# Patient Record
Sex: MALE | Race: WHITE | NOT HISPANIC OR LATINO
[De-identification: names, ages, dates, MRNs, and addresses within clinical notes are randomized per-mention and may not be internally consistent; named-entity substitution may affect disease eponyms.]

---

## 2017-07-07 ENCOUNTER — RESULT REVIEW (OUTPATIENT)
Age: 77
End: 2017-07-07

## 2017-11-17 ENCOUNTER — APPOINTMENT (OUTPATIENT)
Dept: UROLOGY | Facility: CLINIC | Age: 77
End: 2017-11-17
Payer: MEDICARE

## 2017-11-17 VITALS
OXYGEN SATURATION: 98 % | BODY MASS INDEX: 31.83 KG/M2 | WEIGHT: 210 LBS | SYSTOLIC BLOOD PRESSURE: 164 MMHG | HEIGHT: 68 IN | TEMPERATURE: 98 F | DIASTOLIC BLOOD PRESSURE: 92 MMHG | HEART RATE: 80 BPM

## 2017-11-17 DIAGNOSIS — Z86.39 PERSONAL HISTORY OF OTHER ENDOCRINE, NUTRITIONAL AND METABOLIC DISEASE: ICD-10-CM

## 2017-11-17 DIAGNOSIS — Z78.9 OTHER SPECIFIED HEALTH STATUS: ICD-10-CM

## 2017-11-17 DIAGNOSIS — I10 ESSENTIAL (PRIMARY) HYPERTENSION: ICD-10-CM

## 2017-11-17 DIAGNOSIS — Z80.3 FAMILY HISTORY OF MALIGNANT NEOPLASM OF BREAST: ICD-10-CM

## 2017-11-17 PROCEDURE — 99213 OFFICE O/P EST LOW 20 MIN: CPT

## 2017-11-17 RX ORDER — TAMSULOSIN HYDROCHLORIDE 0.4 MG/1
0.4 CAPSULE ORAL
Refills: 0 | Status: ACTIVE | COMMUNITY

## 2017-11-17 RX ORDER — SIMVASTATIN 20 MG/1
20 TABLET, FILM COATED ORAL
Refills: 0 | Status: ACTIVE | COMMUNITY

## 2017-11-17 RX ORDER — CIPROFLOXACIN HYDROCHLORIDE 500 MG/1
500 TABLET, FILM COATED ORAL
Qty: 7 | Refills: 0 | Status: DISCONTINUED | COMMUNITY
Start: 2017-08-18 | End: 2017-11-17

## 2017-11-17 RX ORDER — FINASTERIDE 5 MG/1
5 TABLET, FILM COATED ORAL
Refills: 0 | Status: ACTIVE | COMMUNITY

## 2017-11-17 RX ORDER — BLOOD SUGAR DIAGNOSTIC
STRIP MISCELLANEOUS
Qty: 100 | Refills: 0 | Status: ACTIVE | COMMUNITY
Start: 2017-03-08

## 2017-11-17 RX ORDER — METFORMIN HYDROCHLORIDE 1000 MG/1
1000 TABLET, COATED ORAL
Refills: 0 | Status: ACTIVE | COMMUNITY

## 2018-01-12 ENCOUNTER — RESULT REVIEW (OUTPATIENT)
Age: 78
End: 2018-01-12

## 2018-03-02 ENCOUNTER — APPOINTMENT (OUTPATIENT)
Dept: UROLOGY | Facility: CLINIC | Age: 78
End: 2018-03-02
Payer: MEDICARE

## 2018-03-02 VITALS
RESPIRATION RATE: 16 BRPM | HEART RATE: 70 BPM | TEMPERATURE: 98.1 F | DIASTOLIC BLOOD PRESSURE: 67 MMHG | SYSTOLIC BLOOD PRESSURE: 132 MMHG

## 2018-03-02 DIAGNOSIS — N40.1 BENIGN PROSTATIC HYPERPLASIA WITH LOWER URINARY TRACT SYMPMS: ICD-10-CM

## 2018-03-02 DIAGNOSIS — R31.21 ASYMPTOMATIC MICROSCOPIC HEMATURIA: ICD-10-CM

## 2018-03-02 DIAGNOSIS — R35.1 BENIGN PROSTATIC HYPERPLASIA WITH LOWER URINARY TRACT SYMPMS: ICD-10-CM

## 2018-03-02 PROCEDURE — 52000 CYSTOURETHROSCOPY: CPT

## 2018-05-07 ENCOUNTER — APPOINTMENT (OUTPATIENT)
Dept: OPHTHALMOLOGY | Facility: CLINIC | Age: 78
End: 2018-05-07
Payer: MEDICARE

## 2018-05-07 PROCEDURE — 76514 ECHO EXAM OF EYE THICKNESS: CPT

## 2018-05-07 PROCEDURE — 92004 COMPRE OPH EXAM NEW PT 1/>: CPT

## 2018-05-07 PROCEDURE — 92133 CPTRZD OPH DX IMG PST SGM ON: CPT

## 2018-06-11 ENCOUNTER — APPOINTMENT (OUTPATIENT)
Dept: OPHTHALMOLOGY | Facility: CLINIC | Age: 78
End: 2018-06-11
Payer: MEDICARE

## 2018-06-11 PROCEDURE — 92012 INTRM OPH EXAM EST PATIENT: CPT

## 2018-06-11 PROCEDURE — 92083 EXTENDED VISUAL FIELD XM: CPT

## 2018-07-19 ENCOUNTER — APPOINTMENT (OUTPATIENT)
Dept: OPHTHALMOLOGY | Facility: CLINIC | Age: 78
End: 2018-07-19
Payer: MEDICARE

## 2018-07-19 DIAGNOSIS — H04.301 UNSPECIFIED DACRYOCYSTITIS OF RIGHT LACRIMAL PASSAGE: ICD-10-CM

## 2018-07-19 PROCEDURE — 92012 INTRM OPH EXAM EST PATIENT: CPT

## 2018-07-27 PROBLEM — Z78.9 ALCOHOL USE: Status: ACTIVE | Noted: 2017-11-17

## 2018-09-07 ENCOUNTER — APPOINTMENT (OUTPATIENT)
Dept: UROLOGY | Facility: CLINIC | Age: 78
End: 2018-09-07

## 2018-09-13 ENCOUNTER — APPOINTMENT (OUTPATIENT)
Dept: OPHTHALMOLOGY | Facility: CLINIC | Age: 78
End: 2018-09-13
Payer: MEDICARE

## 2018-09-13 PROCEDURE — 92012 INTRM OPH EXAM EST PATIENT: CPT

## 2018-12-13 ENCOUNTER — APPOINTMENT (OUTPATIENT)
Dept: OPHTHALMOLOGY | Facility: CLINIC | Age: 78
End: 2018-12-13

## 2018-12-31 ENCOUNTER — APPOINTMENT (OUTPATIENT)
Dept: OPHTHALMOLOGY | Facility: CLINIC | Age: 78
End: 2018-12-31
Payer: MEDICARE

## 2018-12-31 PROCEDURE — 92012 INTRM OPH EXAM EST PATIENT: CPT

## 2018-12-31 PROCEDURE — 92083 EXTENDED VISUAL FIELD XM: CPT

## 2019-01-10 PROBLEM — Z85.6: Status: RESOLVED | Noted: 2019-01-10 | Resolved: 2019-01-10

## 2019-01-10 PROBLEM — R91.1 LUNG NODULE: Status: ACTIVE | Noted: 2019-01-10

## 2019-01-16 ENCOUNTER — APPOINTMENT (OUTPATIENT)
Dept: PULMONOLOGY | Facility: CLINIC | Age: 79
End: 2019-01-16

## 2019-01-17 ENCOUNTER — APPOINTMENT (OUTPATIENT)
Dept: THORACIC SURGERY | Facility: CLINIC | Age: 79
End: 2019-01-17

## 2019-01-17 ENCOUNTER — APPOINTMENT (OUTPATIENT)
Dept: PULMONOLOGY | Facility: CLINIC | Age: 79
End: 2019-01-17
Payer: MEDICARE

## 2019-01-17 VITALS
BODY MASS INDEX: 31.78 KG/M2 | HEART RATE: 102 BPM | OXYGEN SATURATION: 97 % | WEIGHT: 209 LBS | DIASTOLIC BLOOD PRESSURE: 80 MMHG | SYSTOLIC BLOOD PRESSURE: 140 MMHG

## 2019-01-17 VITALS — HEART RATE: 88 BPM

## 2019-01-17 DIAGNOSIS — R91.1 SOLITARY PULMONARY NODULE: ICD-10-CM

## 2019-01-17 DIAGNOSIS — Z03.89 ENCOUNTER FOR OBSERVATION FOR OTHER SUSPECTED DISEASES AND CONDITIONS RULED OUT: ICD-10-CM

## 2019-01-17 DIAGNOSIS — Z85.6 PERSONAL HISTORY OF LEUKEMIA: ICD-10-CM

## 2019-01-17 PROCEDURE — 99205 OFFICE O/P NEW HI 60 MIN: CPT | Mod: 25

## 2019-01-17 PROCEDURE — 94010 BREATHING CAPACITY TEST: CPT

## 2019-01-17 RX ORDER — LANCETS 28 GAUGE
EACH MISCELLANEOUS
Qty: 100 | Refills: 0 | Status: ACTIVE | COMMUNITY
Start: 2018-09-24

## 2019-01-17 RX ORDER — MUPIROCIN 20 MG/G
2 OINTMENT TOPICAL
Qty: 22 | Refills: 0 | Status: ACTIVE | COMMUNITY
Start: 2018-11-06

## 2019-01-17 RX ORDER — LISINOPRIL 20 MG/1
20 TABLET ORAL
Qty: 90 | Refills: 0 | Status: ACTIVE | COMMUNITY
Start: 2018-07-30

## 2019-01-17 RX ORDER — GLIMEPIRIDE 1 MG/1
1 TABLET ORAL
Qty: 90 | Refills: 0 | Status: ACTIVE | COMMUNITY
Start: 2018-07-30

## 2019-01-17 NOTE — DISCUSSION/SUMMARY
[FreeTextEntry1] : CLL with low IgG, recently started Rx and IVIG\par PET scan with mild uptake ( SUV 2.2) RLL infiltrate\par never smoker, normal spirometry, no pulmonary complaints\par Heavy snored, suspect, nocturnal aspiration, KB/AIS  not excluded, but less likely\par Discussed with pt, at this point will repeat CT scan 3 months and re evaluate\par aspiration precautions discussed, does not want sleep study, no evidence of active infection at this time\par He has T surgery input Dr Knight\par will follow up post\par Vaccinations per Oncology

## 2019-01-17 NOTE — PHYSICAL EXAM
[General Appearance - Well Developed] : well developed [Normal Appearance] : normal appearance [Well Groomed] : well groomed [General Appearance - Well Nourished] : well nourished [Normal Conjunctiva] : the conjunctiva exhibited no abnormalities [Normal Oropharynx] : normal oropharynx [II] : II [Neck Appearance] : the appearance of the neck was normal [Heart Rate And Rhythm] : heart rate and rhythm were normal [Heart Sounds] : normal S1 and S2 [Murmurs] : no murmurs present [Edema] : no peripheral edema present [Respiration, Rhythm And Depth] : normal respiratory rhythm and effort [Exaggerated Use Of Accessory Muscles For Inspiration] : no accessory muscle use [Auscultation Breath Sounds / Voice Sounds] : lungs were clear to auscultation bilaterally [Lungs Percussion] : the lungs were normal to percussion [Abnormal Walk] : normal gait [Nail Clubbing] : no clubbing of the fingernails [Cyanosis, Localized] : no localized cyanosis [Skin Color & Pigmentation] : normal skin color and pigmentation [] : no rash [No Focal Deficits] : no focal deficits [Oriented To Time, Place, And Person] : oriented to person, place, and time [Impaired Insight] : insight and judgment were intact [Affect] : the affect was normal [Memory Recent] : recent memory was not impaired [FreeTextEntry1] : no chest wall abn

## 2019-01-17 NOTE — REASON FOR VISIT
[Initial Evaluation] : an initial evaluation [Abnormal CT Scan] : abnormal CT Scan [FreeTextEntry2] : lung nodule

## 2019-01-17 NOTE — CONSULT LETTER
[Dear  ___] : Dear  [unfilled], [Consult Letter:] : I had the pleasure of evaluating your patient, [unfilled]. [Please see my note below.] : Please see my note below. [Sincerely,] : Sincerely, [FreeTextEntry3] : [facility] DO [facility]\par Pulmonary Critical Care\par Director Pulmonary Division\par Medical Director Respiratory Therapy\par [facility]\par \par  [DrNiurka  ___] : Dr. SHEEHAN [DrNiurka ___] : Dr. SHEEHAN

## 2019-01-17 NOTE — REVIEW OF SYSTEMS
[As Noted in HPI] : as noted in HPI [Negative] : Psychiatric [FreeTextEntry7] : diarrhea , on probiotic, improving, almost normal

## 2019-01-17 NOTE — HISTORY OF PRESENT ILLNESS
[FreeTextEntry1] : Hx CLL , never required rx\par had sinus surgery 2018, problems with tear drainage, saw SLL/CLL cells in respiratory mucosa\par PET scan with uptake RLL\par never smoker\par IgG low\par just started treatment Ibrutinib and IVIG\par no fever, chill, chest pain\par no sig dyspnea\par worked as [employer]\par no pets\par remains active

## 2019-01-22 NOTE — HISTORY OF PRESENT ILLNESS
[FreeTextEntry1] : Mr. Perry is an 78 year old male with hx of lymphoid leukemia, found to have a mildly PET-avid right lower lobe nodule on recent PET/CT for routine follow up of leukemia.  Patient now presents for surgical evaluation.

## 2019-01-22 NOTE — CONSULT LETTER
[FreeTextEntry2] : Dr. Valles\par [address] [place] [address] Ave\[place] [id number] [FreeTextEntry3] : Ayaan Knight MD\par Director of Thoracic, [facility]\par Cardiovascular & Thoracic Surgery\par \par [facility] \par [address] [place] [address]\[place] [address]\par

## 2019-01-24 ENCOUNTER — APPOINTMENT (OUTPATIENT)
Dept: THORACIC SURGERY | Facility: CLINIC | Age: 79
End: 2019-01-24

## 2019-02-26 ENCOUNTER — APPOINTMENT (OUTPATIENT)
Dept: OPHTHALMOLOGY | Facility: CLINIC | Age: 79
End: 2019-02-26

## 2019-03-18 ENCOUNTER — APPOINTMENT (OUTPATIENT)
Dept: OPHTHALMOLOGY | Facility: CLINIC | Age: 79
End: 2019-03-18
Payer: MEDICARE

## 2019-03-18 DIAGNOSIS — H04.552 ACQUIRED STENOSIS OF LEFT NASOLACRIMAL DUCT: ICD-10-CM

## 2019-03-18 DIAGNOSIS — H04.551 ACQUIRED STENOSIS OF RIGHT NASOLACRIMAL DUCT: ICD-10-CM

## 2019-03-18 PROCEDURE — 92133 CPTRZD OPH DX IMG PST SGM ON: CPT

## 2019-03-18 PROCEDURE — 92014 COMPRE OPH EXAM EST PT 1/>: CPT | Mod: 25

## 2019-04-18 ENCOUNTER — APPOINTMENT (OUTPATIENT)
Dept: PULMONOLOGY | Facility: CLINIC | Age: 79
End: 2019-04-18
Payer: MEDICARE

## 2019-04-18 VITALS
HEART RATE: 103 BPM | BODY MASS INDEX: 31.78 KG/M2 | SYSTOLIC BLOOD PRESSURE: 128 MMHG | WEIGHT: 209 LBS | OXYGEN SATURATION: 100 % | DIASTOLIC BLOOD PRESSURE: 80 MMHG

## 2019-04-18 PROCEDURE — 99214 OFFICE O/P EST MOD 30 MIN: CPT

## 2019-04-18 RX ORDER — NEOMYCIN AND POLYMYXIN B SULFATES AND DEXAMETHASONE 3.5; 10000; 1 MG/G; [IU]/G; MG/G
3.5-10000-0.1 OINTMENT OPHTHALMIC
Qty: 4 | Refills: 0 | Status: DISCONTINUED | COMMUNITY
Start: 2017-11-07 | End: 2019-04-18

## 2019-04-18 RX ORDER — IBRUTINIB 420 MG/1
420 TABLET, FILM COATED ORAL
Qty: 28 | Refills: 0 | Status: DISCONTINUED | COMMUNITY
Start: 2019-01-11 | End: 2019-04-18

## 2019-04-18 RX ORDER — L. ACIDOPHILUS/PECTIN, CITRUS 25MM-100MG
TABLET ORAL
Qty: 28 | Refills: 0 | Status: DISCONTINUED | COMMUNITY
Start: 2018-08-22 | End: 2019-04-18

## 2019-04-18 RX ORDER — LISINOPRIL 10 MG/1
10 TABLET ORAL
Refills: 0 | Status: DISCONTINUED | COMMUNITY
End: 2019-04-18

## 2019-04-18 RX ORDER — SULFACETAMIDE SODIUM 100 MG/ML
10 SOLUTION OPHTHALMIC
Qty: 15 | Refills: 0 | Status: DISCONTINUED | COMMUNITY
Start: 2018-08-22 | End: 2019-04-18

## 2019-04-18 RX ORDER — AZITHROMYCIN 250 MG/1
250 TABLET, FILM COATED ORAL
Qty: 6 | Refills: 0 | Status: DISCONTINUED | COMMUNITY
Start: 2018-10-19 | End: 2019-04-18

## 2019-04-18 RX ORDER — CEFADROXIL 1000 MG/1
1 TABLET ORAL
Qty: 28 | Refills: 0 | Status: DISCONTINUED | COMMUNITY
Start: 2018-07-23 | End: 2019-04-18

## 2019-04-18 RX ORDER — GENTAMICIN SULFATE 3 MG/ML
0.3 SOLUTION OPHTHALMIC
Qty: 10 | Refills: 0 | Status: DISCONTINUED | COMMUNITY
Start: 2018-10-19 | End: 2019-04-18

## 2019-04-18 RX ORDER — DICLOXACILLIN SODIUM 500 MG/1
500 CAPSULE ORAL
Qty: 40 | Refills: 0 | Status: DISCONTINUED | COMMUNITY
Start: 2018-08-22 | End: 2019-04-18

## 2019-04-18 RX ORDER — PILOCARPINE HYDROCHLORIDE OPHTHALMIC SOLUTION 20 MG/ML
2 SOLUTION/ DROPS OPHTHALMIC 4 TIMES DAILY
Qty: 1 | Refills: 4 | Status: DISCONTINUED | COMMUNITY
Start: 2018-12-31 | End: 2019-04-18

## 2019-04-18 RX ORDER — TOBRAMYCIN AND DEXAMETHASONE 3; 1 MG/ML; MG/ML
0.3-0.1 SUSPENSION/ DROPS OPHTHALMIC
Qty: 5 | Refills: 0 | Status: DISCONTINUED | COMMUNITY
Start: 2018-09-18 | End: 2019-04-18

## 2019-04-18 RX ORDER — CIPROFLOXACIN HYDROCHLORIDE 250 MG/1
250 TABLET, FILM COATED ORAL
Qty: 10 | Refills: 0 | Status: DISCONTINUED | COMMUNITY
Start: 2018-08-29 | End: 2019-04-18

## 2019-04-18 RX ORDER — BACITRACIN ZINC AND POLYMYXIN B SULFATE 500; 10000 [USP'U]/G; [USP'U]/G
500-10000 OINTMENT OPHTHALMIC
Qty: 4 | Refills: 0 | Status: DISCONTINUED | COMMUNITY
Start: 2018-07-23 | End: 2019-04-18

## 2019-04-18 RX ORDER — TIMOLOL MALEATE 5 MG/ML
0.5 SOLUTION OPHTHALMIC
Qty: 30 | Refills: 0 | Status: DISCONTINUED | COMMUNITY
Start: 2018-09-05 | End: 2019-04-18

## 2019-04-18 RX ORDER — DORZOLAMIDE HYDROCHLORIDE TIMOLOL MALEATE 20; 5 MG/ML; MG/ML
22.3-6.8 SOLUTION/ DROPS OPHTHALMIC
Refills: 0 | Status: DISCONTINUED | COMMUNITY
End: 2019-04-18

## 2019-04-18 RX ORDER — NEOMYCIN SULFATE, POLYMYXIN B SULFATE, HYDROCORTISONE 3.5; 10000; 1 MG/ML; [USP'U]/ML; MG/ML
1 SOLUTION/ DROPS AURICULAR (OTIC)
Qty: 10 | Refills: 0 | Status: DISCONTINUED | COMMUNITY
Start: 2018-08-16 | End: 2019-04-18

## 2019-04-18 RX ORDER — AZELASTINE HYDROCHLORIDE 137 UG/1
0.1 SPRAY, METERED NASAL
Qty: 30 | Refills: 0 | Status: DISCONTINUED | COMMUNITY
Start: 2017-08-15 | End: 2019-04-18

## 2019-04-18 RX ORDER — ERYTHROMYCIN 5 MG/G
5 OINTMENT OPHTHALMIC
Qty: 4 | Refills: 0 | Status: DISCONTINUED | COMMUNITY
Start: 2019-01-09 | End: 2019-04-18

## 2019-04-18 RX ORDER — DORZOLAMIDE HYDROCHLORIDE 20 MG/ML
2 SOLUTION OPHTHALMIC
Qty: 30 | Refills: 0 | Status: DISCONTINUED | COMMUNITY
Start: 2018-09-05 | End: 2019-04-18

## 2019-04-18 RX ORDER — LATANOPROST/PF 0.005 %
0.01 DROPS OPHTHALMIC (EYE)
Refills: 0 | Status: DISCONTINUED | COMMUNITY
End: 2019-04-18

## 2019-04-18 RX ORDER — AMOXICILLIN 500 MG/1
500 CAPSULE ORAL
Qty: 20 | Refills: 0 | Status: DISCONTINUED | COMMUNITY
Start: 2018-08-16 | End: 2019-04-18

## 2019-04-18 RX ORDER — TOBRAMYCIN 3 MG/ML
0.3 SOLUTION/ DROPS OPHTHALMIC
Qty: 5 | Refills: 0 | Status: DISCONTINUED | COMMUNITY
Start: 2017-08-23 | End: 2019-04-18

## 2019-04-18 RX ORDER — FLUOROMETHOLONE 1 MG/ML
0.1 SOLUTION/ DROPS OPHTHALMIC
Qty: 5 | Refills: 0 | Status: DISCONTINUED | COMMUNITY
Start: 2017-11-07 | End: 2019-04-18

## 2019-04-18 NOTE — HISTORY OF PRESENT ILLNESS
[FreeTextEntry1] : Hx CLL , never required rx\par SLL/CLL \par PET scan with uptake RLL\par repeat CT scan with resolved infiltrate RLL\par never smoker\par IgG low,on IVIG\par no fever, chill, chest pain\par no sig dyspnea\par worked as [employer]\par no pets\par remains active

## 2019-04-18 NOTE — REVIEW OF SYSTEMS
[As Noted in HPI] : as noted in HPI [Negative] : Dermatologic [FreeTextEntry7] : diarrhea , on probiotic, improving, almost normal

## 2019-04-18 NOTE — PHYSICAL EXAM
[General Appearance - Well Developed] : well developed [Normal Appearance] : normal appearance [Well Groomed] : well groomed [General Appearance - Well Nourished] : well nourished [Normal Oropharynx] : normal oropharynx [Normal Conjunctiva] : the conjunctiva exhibited no abnormalities [II] : II [Neck Appearance] : the appearance of the neck was normal [Heart Sounds] : normal S1 and S2 [Heart Rate And Rhythm] : heart rate and rhythm were normal [Murmurs] : no murmurs present [Edema] : no peripheral edema present [Respiration, Rhythm And Depth] : normal respiratory rhythm and effort [Exaggerated Use Of Accessory Muscles For Inspiration] : no accessory muscle use [Auscultation Breath Sounds / Voice Sounds] : lungs were clear to auscultation bilaterally [FreeTextEntry1] : no chest wall abn [Lungs Percussion] : the lungs were normal to percussion [Nail Clubbing] : no clubbing of the fingernails [Abnormal Walk] : normal gait [Cyanosis, Localized] : no localized cyanosis [No Focal Deficits] : no focal deficits [Oriented To Time, Place, And Person] : oriented to person, place, and time [Impaired Insight] : insight and judgment were intact [Affect] : the affect was normal [Memory Recent] : recent memory was not impaired [] : no rash [Skin Color & Pigmentation] : normal skin color and pigmentation

## 2019-04-18 NOTE — DISCUSSION/SUMMARY
[FreeTextEntry1] : CLL with low IgG, recently started Rx and IVIG\par CTY scan with resolved RLL infiltrate, new area of atelectasis, will repeat 3-4 months\par never smoker, normal spirometry, no pulmonary complaints\par ? recurrent nocturnal aspiration, doesn’t want sleep study, precautions stressed\par Has a fib, on eliquis, followed by Cardiology\par Vaccinations per Oncology\par 4 months or sooner if needed

## 2019-04-18 NOTE — REASON FOR VISIT
[Abnormal CT Scan] : abnormal CT Scan [Initial Evaluation] : an initial evaluation [FreeTextEntry2] : lung nodule

## 2019-04-18 NOTE — PROCEDURE
[FreeTextEntry1] : spirometry is normal\par PET/CT reviewed;11/18: RLL infiltrate , SUV 2.2\par CT 2/19: resolved RLL infiltrate, increased atelectasis, prom pulmonary artery

## 2019-04-18 NOTE — CONSULT LETTER
[Dear  ___] : Dear  [unfilled], [Please see my note below.] : Please see my note below. [Consult Letter:] : I had the pleasure of evaluating your patient, [unfilled]. [Sincerely,] : Sincerely, [FreeTextEntry3] : [facility] DO [facility]\par Pulmonary Critical Care\par Director Pulmonary Division\par Medical Director Respiratory Therapy\par [facility]\par \par  [DrNiurka  ___] : Dr. SHEEHAN [DrNiurka ___] : Dr. SHEEHAN

## 2019-06-17 ENCOUNTER — APPOINTMENT (OUTPATIENT)
Dept: OPHTHALMOLOGY | Facility: CLINIC | Age: 79
End: 2019-06-17
Payer: MEDICARE

## 2019-06-17 DIAGNOSIS — H40.1133 PRIMARY OPEN-ANGLE GLAUCOMA, BILATERAL, SEVERE STAGE: ICD-10-CM

## 2019-06-17 PROCEDURE — 92083 EXTENDED VISUAL FIELD XM: CPT

## 2019-06-17 PROCEDURE — 92012 INTRM OPH EXAM EST PATIENT: CPT

## 2019-07-03 ENCOUNTER — APPOINTMENT (OUTPATIENT)
Dept: PULMONOLOGY | Facility: CLINIC | Age: 79
End: 2019-07-03
Payer: MEDICARE

## 2019-07-03 VITALS
BODY MASS INDEX: 30.92 KG/M2 | DIASTOLIC BLOOD PRESSURE: 70 MMHG | HEART RATE: 86 BPM | HEIGHT: 68 IN | SYSTOLIC BLOOD PRESSURE: 110 MMHG | OXYGEN SATURATION: 98 % | WEIGHT: 204 LBS

## 2019-07-03 DIAGNOSIS — R91.1 SOLITARY PULMONARY NODULE: ICD-10-CM

## 2019-07-03 DIAGNOSIS — R93.89 ABNORMAL FINDINGS ON DIAGNOSTIC IMAGING OF OTHER SPECIFIED BODY STRUCTURES: ICD-10-CM

## 2019-07-03 PROCEDURE — 99214 OFFICE O/P EST MOD 30 MIN: CPT

## 2019-07-03 NOTE — HISTORY OF PRESENT ILLNESS
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

## 2019-07-03 NOTE — CONSULT LETTER
[Dear  ___] : Dear  [unfilled], [Consult Letter:] : I had the pleasure of evaluating your patient, [unfilled]. [Please see my note below.] : Please see my note below. [Sincerely,] : Sincerely, [FreeTextEntry3] : Karsten [facility] DO [facility]\par Pulmonary Critical Care\par Director Pulmonary Division\par Medical Director Respiratory Therapy\par [facility]\par \par  [DrNiurka  ___] : Dr. SHEEHAN [DrNiurka ___] : Dr. SHEEHAN

## 2019-07-03 NOTE — PROCEDURE
[FreeTextEntry1] : spirometry is normal\par PET/CT reviewed;11/18: RLL infiltrate , SUV 2.2\par CT 2/19: resolved RLL infiltrate, increased atelectasis, prom pulmonary artery\par CT scan 6/19: stable areas of scarring, no sig abn

## 2019-07-03 NOTE — PHYSICAL EXAM
[Normal Appearance] : normal appearance [General Appearance - Well Developed] : well developed [General Appearance - Well Nourished] : well nourished [Well Groomed] : well groomed [Normal Conjunctiva] : the conjunctiva exhibited no abnormalities [Normal Oropharynx] : normal oropharynx [II] : II [Neck Appearance] : the appearance of the neck was normal [Heart Rate And Rhythm] : heart rate and rhythm were normal [Heart Sounds] : normal S1 and S2 [Murmurs] : no murmurs present [Edema] : no peripheral edema present [Respiration, Rhythm And Depth] : normal respiratory rhythm and effort [Exaggerated Use Of Accessory Muscles For Inspiration] : no accessory muscle use [Auscultation Breath Sounds / Voice Sounds] : lungs were clear to auscultation bilaterally [Lungs Percussion] : the lungs were normal to percussion [Abnormal Walk] : normal gait [FreeTextEntry1] : no chest wall abn [Nail Clubbing] : no clubbing of the fingernails [Cyanosis, Localized] : no localized cyanosis [Oriented To Time, Place, And Person] : oriented to person, place, and time [No Focal Deficits] : no focal deficits [Impaired Insight] : insight and judgment were intact [Memory Recent] : recent memory was not impaired [Affect] : the affect was normal [Skin Color & Pigmentation] : normal skin color and pigmentation [] : no rash

## 2019-08-19 ENCOUNTER — APPOINTMENT (OUTPATIENT)
Dept: OPHTHALMOLOGY | Facility: CLINIC | Age: 79
End: 2019-08-19
Payer: MEDICARE

## 2019-08-19 ENCOUNTER — NON-APPOINTMENT (OUTPATIENT)
Age: 79
End: 2019-08-19

## 2019-08-19 PROCEDURE — 92012 INTRM OPH EXAM EST PATIENT: CPT

## 2019-10-16 ENCOUNTER — OUTPATIENT (OUTPATIENT)
Dept: OUTPATIENT SERVICES | Facility: HOSPITAL | Age: 79
LOS: 1 days | End: 2019-10-16
Payer: COMMERCIAL

## 2019-10-16 DIAGNOSIS — R06.09 OTHER FORMS OF DYSPNEA: ICD-10-CM

## 2019-10-16 PROCEDURE — 93017 CV STRESS TEST TRACING ONLY: CPT

## 2019-10-16 PROCEDURE — 93018 CV STRESS TEST I&R ONLY: CPT

## 2019-10-16 PROCEDURE — 78452 HT MUSCLE IMAGE SPECT MULT: CPT

## 2019-10-16 PROCEDURE — 78452 HT MUSCLE IMAGE SPECT MULT: CPT | Mod: 26

## 2019-10-16 PROCEDURE — A9500: CPT

## 2019-10-16 PROCEDURE — 93016 CV STRESS TEST SUPVJ ONLY: CPT

## 2019-10-21 ENCOUNTER — NON-APPOINTMENT (OUTPATIENT)
Age: 79
End: 2019-10-21

## 2019-10-21 ENCOUNTER — APPOINTMENT (OUTPATIENT)
Dept: OPHTHALMOLOGY | Facility: CLINIC | Age: 79
End: 2019-10-21
Payer: MEDICARE

## 2019-10-21 PROCEDURE — 92012 INTRM OPH EXAM EST PATIENT: CPT | Mod: 25

## 2019-10-21 PROCEDURE — 92133 CPTRZD OPH DX IMG PST SGM ON: CPT

## 2019-10-21 PROCEDURE — 67820 REVISE EYELASHES: CPT | Mod: E1

## 2019-10-21 PROCEDURE — 92083 EXTENDED VISUAL FIELD XM: CPT

## 2019-12-12 ENCOUNTER — OUTPATIENT (OUTPATIENT)
Dept: OUTPATIENT SERVICES | Facility: HOSPITAL | Age: 79
LOS: 1 days | End: 2019-12-12
Payer: COMMERCIAL

## 2019-12-12 VITALS
SYSTOLIC BLOOD PRESSURE: 137 MMHG | DIASTOLIC BLOOD PRESSURE: 90 MMHG | TEMPERATURE: 98 F | HEIGHT: 68 IN | OXYGEN SATURATION: 95 % | WEIGHT: 207.9 LBS | HEART RATE: 99 BPM | RESPIRATION RATE: 18 BRPM

## 2019-12-12 DIAGNOSIS — Z01.818 ENCOUNTER FOR OTHER PREPROCEDURAL EXAMINATION: ICD-10-CM

## 2019-12-12 DIAGNOSIS — Z98.890 OTHER SPECIFIED POSTPROCEDURAL STATES: Chronic | ICD-10-CM

## 2019-12-12 DIAGNOSIS — Z98.49 CATARACT EXTRACTION STATUS, UNSPECIFIED EYE: Chronic | ICD-10-CM

## 2019-12-12 DIAGNOSIS — Z96.649 PRESENCE OF UNSPECIFIED ARTIFICIAL HIP JOINT: Chronic | ICD-10-CM

## 2019-12-12 LAB
ANION GAP SERPL CALC-SCNC: 13 MMOL/L — SIGNIFICANT CHANGE UP (ref 5–17)
APTT BLD: 35.7 SEC — SIGNIFICANT CHANGE UP (ref 27.5–36.3)
BUN SERPL-MCNC: 25 MG/DL — HIGH (ref 8–20)
CALCIUM SERPL-MCNC: 9.1 MG/DL — SIGNIFICANT CHANGE UP (ref 8.6–10.2)
CHLORIDE SERPL-SCNC: 110 MMOL/L — HIGH (ref 98–107)
CO2 SERPL-SCNC: 21 MMOL/L — LOW (ref 22–29)
CREAT SERPL-MCNC: 1.08 MG/DL — SIGNIFICANT CHANGE UP (ref 0.5–1.3)
GLUCOSE SERPL-MCNC: 165 MG/DL — HIGH (ref 70–115)
HCT VFR BLD CALC: 38.8 % — LOW (ref 39–50)
HGB BLD-MCNC: 12.2 G/DL — LOW (ref 13–17)
INR BLD: 1.74 RATIO — HIGH (ref 0.88–1.16)
MAGNESIUM SERPL-MCNC: 1.8 MG/DL — SIGNIFICANT CHANGE UP (ref 1.6–2.6)
MCHC RBC-ENTMCNC: 27.8 PG — SIGNIFICANT CHANGE UP (ref 27–34)
MCHC RBC-ENTMCNC: 31.4 GM/DL — LOW (ref 32–36)
MCV RBC AUTO: 88.4 FL — SIGNIFICANT CHANGE UP (ref 80–100)
PLATELET # BLD AUTO: 154 K/UL — SIGNIFICANT CHANGE UP (ref 150–400)
POTASSIUM SERPL-MCNC: 4.2 MMOL/L — SIGNIFICANT CHANGE UP (ref 3.5–5.3)
POTASSIUM SERPL-SCNC: 4.2 MMOL/L — SIGNIFICANT CHANGE UP (ref 3.5–5.3)
PROTHROM AB SERPL-ACNC: 20.3 SEC — HIGH (ref 10–12.9)
RBC # BLD: 4.39 M/UL — SIGNIFICANT CHANGE UP (ref 4.2–5.8)
RBC # FLD: 14.5 % — SIGNIFICANT CHANGE UP (ref 10.3–14.5)
SODIUM SERPL-SCNC: 144 MMOL/L — SIGNIFICANT CHANGE UP (ref 135–145)
WBC # BLD: 8.45 K/UL — SIGNIFICANT CHANGE UP (ref 3.8–10.5)
WBC # FLD AUTO: 8.45 K/UL — SIGNIFICANT CHANGE UP (ref 3.8–10.5)

## 2019-12-12 PROCEDURE — 36415 COLL VENOUS BLD VENIPUNCTURE: CPT

## 2019-12-12 PROCEDURE — 85027 COMPLETE CBC AUTOMATED: CPT

## 2019-12-12 PROCEDURE — 93010 ELECTROCARDIOGRAM REPORT: CPT

## 2019-12-12 PROCEDURE — G0463: CPT

## 2019-12-12 PROCEDURE — 85610 PROTHROMBIN TIME: CPT

## 2019-12-12 PROCEDURE — 85730 THROMBOPLASTIN TIME PARTIAL: CPT

## 2019-12-12 PROCEDURE — 83735 ASSAY OF MAGNESIUM: CPT

## 2019-12-12 PROCEDURE — 80048 BASIC METABOLIC PNL TOTAL CA: CPT

## 2019-12-12 PROCEDURE — 93005 ELECTROCARDIOGRAM TRACING: CPT

## 2019-12-12 NOTE — ASU PATIENT PROFILE, ADULT - PMH
BPH (benign prostatic hyperplasia)    Chronic lymphoid leukemia    Gastritis and gastroduodenitis    HLD (hyperlipidemia)    HTN (hypertension)    Osteoarthritis

## 2019-12-12 NOTE — H&P PST ADULT - NSICDXPASTMEDICALHX_GEN_ALL_CORE_FT
PAST MEDICAL HISTORY:  Atrial fibrillation new diagnosis (within 3 months)    BPH (benign prostatic hyperplasia)     Chronic lymphoid leukemia     Gastritis and gastroduodenitis     Glaucoma     HLD (hyperlipidemia)     HTN (hypertension)     Hypoglobulinemia     Lymphoma, small lymphocytic HCC    Osteoarthritis

## 2019-12-12 NOTE — H&P PST ADULT - ASSESSMENT
78 y/o male with h/o HTN, HPL, CLL, new diagnosis of atrial fibrillation on Eliquis in need of cardiac clearance for eye surgery s/p abnormal NST that revealed inferior and apical ischemia for elective LHC +/- PCI.    Note: CHADS2 score is 3--instructed patient to hold Eliquis for 3 days prior to the procedure (last dose Franc 12/15/19) as discussed with Dr. Mccoy.  Patient also instructed to hold Metformin for 3 days prior to the procedure and Glimepiride the day of the procedure.  All with verbal understanding.

## 2019-12-12 NOTE — H&P PST ADULT - HISTORY OF PRESENT ILLNESS
This is an 78 y/o male with h/o HTN, HLD, CLL currently undergoing treatment with IV Ig (s/p chemo x 6 treatments), and new diagnosis of Atrial fibrillation on Eliquis.  Patient needs cardiac clearance for left eye surgery.  His nuclear stress test was abnormal revealing a small to medium sized area of ischemia involving the inferior wall and apex.  He presents today for PST for elective LHC for further evaluation.  He denies chest pain.  He c/o CANNON.      Symptoms:        Angina (Class): 2-3       Ischemic Symptoms: CANNON    Heart Failure: NONE        Assessment of LVEF (Must be within 6 months):        EF: 55%       Assessed by: TTE       Date: 10/3/19    Prior Cardiac Interventions (LHC, stents, CABG): NONE    Noninvasive Testing:   Stress Test: Date: 10/15/19  < from: Nuclear Stress Test-Pharmacologic (10.16.19 @ 09:43) >  TYPE OF TEST: Rest/Stress Pharmacologic  INDICATION: Abnormal electrocardiogram [ECG] [EKG]  (R94.31)  ------------------------------------------------------------------------  HISTORY:  CARDIAC HISTORY: This is a 78 year male with DM HTN Afib  on ( Eliquis) Presented to cardiologist with exertional  dyspnea and need for eye surgery  RISK FACTORS: Diabetes, Elevated Cholesterol, Hypertension  MEDICATIONS:  eliquis,amaryl,insulin,lisinorpil,toprol,zocor  ------------------------------------------------------------------------    BASELINE ELECTROCARDIOGRAM:  Rhythm: Atrial Fibrillation - 84 BPM  Conduction Defect: LAD, INCOMPLETE LBBB    ------------------------------------------------------------------------    HEMODYNAMIC PARAMETERS:          HR      BP  Baseline  Pre-Injection             89  131/82  00:00     Inject Regadenoson        86  00:30     Post Injection            86  01:07     Post Injection           107  131/93  02:00     Post Injection           126  119/74  03:00   Post Injection           121  139/74  04:00     Post Injection           118  139/80  05:00     Recovery                 114  134/80  06:00     Recovery                 113  127/76  07:00     Recovery                 103  127/78  10:00     Recovery                  96  142/78  12:00     Recovery                 110  148/94  17:00     Recovery                  98  ------------------------------------------------------------------------    Agent: Regadenoson 0.4 mg/5 ml NS. injected over 10 sec.  HR: Baseline HR: 89 bpm   Peak HR: 126 bpm (90% of MPHR)  MPHR: 140 bpm   85% of MPHR: 119 bpm  BP: Baseline BP: 131/82 mmHg   Peak BP: 139/74 mmHg   Peak  RPP: [id number] (Rate Pressure Product)  Last Caffeine intake: 12 hrs  Terminated: Completion of protocol  ------------------------------------------------------------------------    SYMPTOMS/FINDINGS:  Symptoms: palpitations  Chest pain: No chest pain with administration of  Regadenoson  ------------------------------------------------------------------------    ECG ABNORMALITIES DURING/AFTER STRESS:   Abnormalities: There were no diagnostic changes.   ST Changes:pt reported palpitations @6:00 minutes given  Aminophylline 75 mg  Pt continued palpitations @1300 IV lopressor 5 mg with  relief of symptoms  ------------------------------------------------------------------------    NEW ARRHYTHMIAS DEVELOPED DURING/AFTER STRESS:  few PVCS  ------------------------------------------------------------------------    STRESS TEST IMPRESSIONS:  Unable to walk  Symptom: palpitations .  HR Response: Appropriate.  BP Response: Appropriate.  Heart Rhythm: Atrial Fibrillation - 84 BPM.  ECG Changes: pt reported palpitations @6:00 minutes given  Aminophylline 75 mg  Pt continued palpitations @1300 IVlopressor 5 mg with  relief of symptoms .  Arrhythmia: few PVCS .    ------------------------------------------------------------------------    PROCEDURE:  10.5 mCi of Tc99m Sestamibi were injected intravenously at  rest. Approximately 45 minutes later, tomographic images  were obtained in a 180 degree arc from right anterior  oblique to left anterior oblique with 64 stops. At a  separate time, 32.1 mCi of Tc99m Sestamibi were injected  intravenously during stress protocol. Approximately 45  minute(s) later, tomographic images were obtained in a 180  degree arc from right anterior oblique to left anterior  oblique with 64 stops. The tomographic slices were  reconstructed in 3 orthogonal planes (short axis,  horizontal long axis and vertical long axis).  Interpretation was performed both by visual and  quantitative analysis.    ------------------------------------------------------------------------    NUCLEAR FINDINGS:  Small to medium size area of ischemia involving the  inferior wall and apex  ------------------------------------------------------------------------      GATED ANALYSIS:  Post-stress resting myocardial perfusion gated SPECT  imaging was performed (LVEF = 55 %;LVEDV = 111 ml.)  ------------------------------------------------------------------------    IMPRESSIONS:Abnormal Study  * Unable to walk  * Symptom: palpitations .  * HR Response: Appropriate.  * BP Response: Appropriate.  * Heart Rhythm: Atrial Fibrillation - 84 BPM.  * ECG Changes: pt reported palpitations @6:00 minutes  given Aminophylline 75 mg  * Pt continued palpitations @1300 IV lopressor 5 mg with  relief of symptoms .  * Arrhythmia: few PVCS .  * Small to medium size area of ischemia involving the  inferior wall and apex  * Post-stress restingmyocardial perfusion gated SPECT  imaging was performed (LVEF = 55 %;LVEDV = 111 ml.)    Conclusion:  Small to medium size area of ischemia involving the  inferior wall and apex  ------------------------------------------------------------------------      ------------------------------------------------------------------------    Confirmed on  10/17/2019 - 08:24:35 by Juve Guerra    < end of copied text >           Echo (Date, Findings): 10/3/19: Normal LV and RV size and systolic function; EF 55%; mild LA enlargement; mild AR; mild MR; normal TV; normal PV    Antianginal Therapies:        Beta Blockers:  metoprolol

## 2019-12-18 ENCOUNTER — OUTPATIENT (OUTPATIENT)
Dept: OUTPATIENT SERVICES | Facility: HOSPITAL | Age: 79
LOS: 1 days | End: 2019-12-18
Payer: COMMERCIAL

## 2019-12-18 ENCOUNTER — TRANSCRIPTION ENCOUNTER (OUTPATIENT)
Age: 79
End: 2019-12-18

## 2019-12-18 VITALS
RESPIRATION RATE: 20 BRPM | WEIGHT: 207.9 LBS | HEART RATE: 99 BPM | HEIGHT: 68 IN | DIASTOLIC BLOOD PRESSURE: 96 MMHG | TEMPERATURE: 98 F | SYSTOLIC BLOOD PRESSURE: 166 MMHG | OXYGEN SATURATION: 95 %

## 2019-12-18 VITALS — HEART RATE: 76 BPM | SYSTOLIC BLOOD PRESSURE: 104 MMHG | OXYGEN SATURATION: 96 % | RESPIRATION RATE: 15 BRPM

## 2019-12-18 DIAGNOSIS — Z96.649 PRESENCE OF UNSPECIFIED ARTIFICIAL HIP JOINT: Chronic | ICD-10-CM

## 2019-12-18 DIAGNOSIS — Z98.890 OTHER SPECIFIED POSTPROCEDURAL STATES: Chronic | ICD-10-CM

## 2019-12-18 DIAGNOSIS — R94.31 ABNORMAL ELECTROCARDIOGRAM [ECG] [EKG]: ICD-10-CM

## 2019-12-18 DIAGNOSIS — Z98.49 CATARACT EXTRACTION STATUS, UNSPECIFIED EYE: Chronic | ICD-10-CM

## 2019-12-18 LAB — GLUCOSE BLDC GLUCOMTR-MCNC: 152 MG/DL — HIGH (ref 70–99)

## 2019-12-18 PROCEDURE — 93458 L HRT ARTERY/VENTRICLE ANGIO: CPT

## 2019-12-18 PROCEDURE — 99152 MOD SED SAME PHYS/QHP 5/>YRS: CPT

## 2019-12-18 PROCEDURE — 93571 IV DOP VEL&/PRESS C FLO 1ST: CPT | Mod: LD

## 2019-12-18 PROCEDURE — 82962 GLUCOSE BLOOD TEST: CPT

## 2019-12-18 PROCEDURE — C1769: CPT

## 2019-12-18 PROCEDURE — 93458 L HRT ARTERY/VENTRICLE ANGIO: CPT | Mod: 26

## 2019-12-18 PROCEDURE — C1887: CPT

## 2019-12-18 PROCEDURE — 93571 IV DOP VEL&/PRESS C FLO 1ST: CPT | Mod: 26,LD

## 2019-12-18 RX ORDER — ACTIVATED CHARCOAL 25 G/120ML
520 SUSPENSION, ORAL (FINAL DOSE FORM) ORAL
Qty: 0 | Refills: 0 | DISCHARGE

## 2019-12-18 RX ORDER — METFORMIN HYDROCHLORIDE 850 MG/1
1 TABLET ORAL
Qty: 0 | Refills: 0 | DISCHARGE

## 2019-12-18 NOTE — PROGRESS NOTE ADULT - SUBJECTIVE AND OBJECTIVE BOX
Department of Cardiology                                                                  [facility]/[place] [address] [place] [address]                                                            Telephone: [phone]. Fax:[phone]                                                                                   Pre Cath Note    79y Male     Planned Procedure: LHC    Pertinent Prior Medications:        Antiplatelet: N/A       Aspirin: N/A       Statin: Zocor 20 mg daily       Beta Blocker: Metoprolol 50 mg BID       CCB: N/A       Other Antianginal: N/A       ACEI: Lisinopril 10 mg daily    ASA: 3  Mallampati: 2  CCS Class: 2-3  GFR: 64  Adjusted Bleeding Risk: 1.2%    HPI: This is an 78 y/o male with h/o HTN, HLD, CLL currently undergoing treatment with IV Ig (s/p chemo x 6 treatments), and new diagnosis of Atrial fibrillation on Eliquis.  Patient needs cardiac clearance for left eye surgery.  His nuclear stress test was abnormal revealing a small to medium sized area of ischemia involving the inferior wall and apex.  He presents today for PST for elective LHC for further evaluation.  He denies chest pain.  He c/o CANNON.     Nuclear Stress Test: 10/16/2019       Protocol: Lexiscan       Exercised for: N/A       Symptoms: Palpitations       Stress EKG: No diagnostic changes       DTS: N/A       Nuclear Imaging: Small to medium size area of ischemia involving the inferior wall and apex    Echo: 10/3/2019       LVSF: Normal       EF:        RVSF: Normal       LA: Mildly dilated       RA: Normal       Mitral Valve: MAC, mild MR       Aortic Valve: Mild AR       Tricuspid Valve: Normal       Pulmonic Valve: Not well visualized       Pericardium: No pericardial effusion    Allergies: No Known Allergies    PAST MEDICAL & SURGICAL HISTORY:  Atrial fibrillation: new diagnosis (within 3 months) CHADS2-Vasc of 4 (age, HTN, DM)  Hypoglobulinemia  Lymphoma, small lymphocytic: HCC  Glaucoma  Osteoarthritis  HLD (hyperlipidemia)  Gastritis and gastroduodenitis  HTN (hypertension)  BPH (benign prostatic hyperplasia)  Chronic lymphoid leukemia  H/O sinus surgery  S/P hip replacement: right hip x 1; left hip x 2  S/P cataract surgery: bilateral    Home Medications:  azelastine 137 mcg/inh (0.1%) nasal spray: 2 spray(s) nasal 2 times a day, As Needed (18 Dec 2019 09:12)  Eliquis 5 mg oral tablet: 1 tab(s) orally 2 times a day (18 Dec 2019 09:12)  finasteride 5 mg oral tablet: 1 tab(s) orally once a day (18 Dec 2019 09:12)  glimepiride 2 mg oral tablet: 1 tab(s) orally once a day (18 Dec 2019 09:12)  ivig: Apply topically to affected area every 30 days (18 Dec 2019 09:12)  lisinopril 10 mg oral tablet: 1 tab(s) orally once a day (18 Dec 2019 09:12)  metFORMIN 1000 mg oral tablet: 1 tab(s) orally 2 times a day (18 Dec 2019 09:12)  metoprolol tartrate 50 mg oral tablet: 1 tab(s) orally 2 times a day (18 Dec 2019 09:12)  mupirocin 2% topical ointment: Apply topically to affected area once a day (18 Dec 2019 09:12)  simvastatin 20 mg oral tablet: 1 tab(s) orally once a day (at bedtime) (18 Dec 2019 09:12)  tamsulosin 0.4 mg oral capsule: 1 cap(s) orally once a day (18 Dec 2019 09:12)    Physical Examination:   General: Awake, alert, speech clear, no acute distress.  Neck: No bruit, normal jugular venous pressures  Chest: Clear CTA, S1, S2, no murmur, RRR  Extremities: No edema          Assessment and Plan:   The patient was examined and interviewed by me today. There has been no change from the original history and physical except as noted above.    Problem List:   1. Stable angina with abnormal nuclear stress test  [facility]  Aspirin 325 mg daily

## 2019-12-18 NOTE — DISCHARGE NOTE PROVIDER - NSDCCPCAREPLAN_GEN_ALL_CORE_FT
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

## 2019-12-18 NOTE — PROGRESS NOTE ADULT - ASSESSMENT
79y Male   Procedure: Left heart catheterization  Pre-op diagnosis: Abnormal nuclear stress test  Post-op diagnosis: Nonobstructive CAD    1. Remove radial band at: 1:00PM    2. If OK, discharge home at: 2:00PM    3. Follow up as an outpatient with: Dr. Guerra    4. Continue current medications

## 2019-12-18 NOTE — DISCHARGE NOTE PROVIDER - NSDCMRMEDTOKEN_GEN_ALL_CORE_FT
azelastine 137 mcg/inh (0.1%) nasal spray: 2 spray(s) nasal 2 times a day, As Needed  dorzolamide 2% ophthalmic solution: 1 drop(s) in each eye 3 times a day  Eliquis 5 mg oral tablet: 1 tab(s) orally 2 times a day  erythromycin 0.5% ophthalmic ointment: 1 application to each affected eye 4 times a day  finasteride 5 mg oral tablet: 1 tab(s) orally once a day  glimepiride 2 mg oral tablet: 1 tab(s) orally once a day  ivig: Apply topically to affected area every 30 days  latanoprost 0.005% ophthalmic solution: 1 drop(s) in each eye once a day (in the evening)  lisinopril 10 mg oral tablet: 1 tab(s) orally once a day  metFORMIN 1000 mg oral tablet: 1 tab(s) orally 2 times a day  metoprolol tartrate 50 mg oral tablet: 1 tab(s) orally 2 times a day  mupirocin 2% topical ointment: Apply topically to affected area once a day  pilocarpine nitrate 2% ophthalmic solution: 1 drop(s) in the left eye 4 times a day  simvastatin 20 mg oral tablet: 1 tab(s) orally once a day (at bedtime)  tamsulosin 0.4 mg oral capsule: 1 cap(s) orally once a day  timolol maleate 0.5% ophthalmic solution: 1 drop(s) in each eye 2 times a day azelastine 137 mcg/inh (0.1%) nasal spray: 2 spray(s) nasal 2 times a day, As Needed  dorzolamide 2% ophthalmic solution: 1 drop(s) in each eye 3 times a day  Eliquis 5 mg oral tablet: 1 tab(s) orally 2 times a day  erythromycin 0.5% ophthalmic ointment: 1 application to each affected eye 4 times a day  finasteride 5 mg oral tablet: 1 tab(s) orally once a day  glimepiride 2 mg oral tablet: 1 tab(s) orally once a day  ivig: Apply topically to affected area every 30 days  latanoprost 0.005% ophthalmic solution: 1 drop(s) in each eye once a day (in the evening)  lisinopril 10 mg oral tablet: 1 tab(s) orally once a day  metFORMIN 1000 mg oral tablet: 1 tab(s) orally 2 times a day. RESTART ON DECEMBER 21, 2019  metoprolol tartrate 50 mg oral tablet: 1 tab(s) orally 2 times a day  mupirocin 2% topical ointment: Apply topically to affected area once a day  pilocarpine nitrate 2% ophthalmic solution: 1 drop(s) in the left eye 4 times a day  simvastatin 20 mg oral tablet: 1 tab(s) orally once a day (at bedtime)  tamsulosin 0.4 mg oral capsule: 1 cap(s) orally once a day  timolol maleate 0.5% ophthalmic solution: 1 drop(s) in each eye 2 times a day

## 2019-12-18 NOTE — DISCHARGE NOTE PROVIDER - NSDCACTIVITY_GEN_ALL_CORE
Showering allowed/No heavy lifting/straining/Stairs allowed/Walking - Indoors allowed/Walking - Outdoors allowed

## 2019-12-18 NOTE — DISCHARGE NOTE NURSING/CASE MANAGEMENT/SOCIAL WORK - NURSING SECTION COMPLETE
DATE OF ADMISSION:  01/01/2018

 

TIME:  10:52 p.m.

 

HISTORY OF PRESENT ILLNESS:  The patient is a pleasant 80-year-old female

with a history of COPD requiring oxygen, 2 liters by nasal cannula,

hypertension, ulcerative colitis presented with increasing shortness of

breath times 2-3 days.  The patient has been following with Dr. Juarez

for primary care.  The patient has been doing okay, doing baseline good

health until about 2-3 days ago when she started to have increasing shortness

of breath, dry cough.  She also has had an episode of fall 2 days ago and has

been having difficulty ambulating because of left foot pain.  She also had 2

more falls since then because apparently she "was losing her balance." 

Persistence of the symptoms, finally came to the ER.  At the ER, blood

pressure was 230/115, improving to 180 systolically.  Pulse rate was 115

initially, respiratory rate initially was 36, saturating 80% on room air. 

The patient was received with bilateral wheezing.  Chest x-ray was showing

dense bibasilar airspace consolidation, right greater than left with

associated pleural effusions typical for pneumonia or aspiration pneumonitis.

 She was given DuoNeb, Solu-Medrol and started on vancomycin and Levaquin. 

On my exam, the patient was seen resting comfortably with her BiPAP in place.

 She states that she is feeling somewhat improved compared to admission. 

Denies having any active shortness of breath, chest pain, palpitations,

nausea, vomiting, dizziness, headache.  No other symptoms noted.

 

REVIEW OF SYSTEMS:  Ten systems reviewed and negative except for the ones

mentioned above.

 

PAST MEDICAL HISTORY:  COPD, moderate on chronic oxygen use 2 liters at

daytime, 4 liters at nighttime, hypertension, ulcerative colitis, depression,

dyslipidemia, status post CEA.

 

MEDICATIONS:  Combivent 4 times a day, lisinopril 20 mg daily, Lasix 20 mg

daily, aspirin 81 mg daily, Pentasa 1000 mg b.i.d., Celexa 40 mg daily,

mirtazapine 50 mg at bedtime, simvastatin 20 mg daily, Protonix 40 mg daily.

 

PAST SURGICAL HISTORY:  Colonoscopy, EGD, knee surgery, total hip

replacement, right side in 2012, hysterectomy.

 

PERSONAL AND SOCIAL HISTORY:  She is .  Current every day smoker. 

Denies alcohol or drug use.

 

FAMILY HISTORY:  Father, heart disease.  Mother, cancer.

 

PHYSICAL EXAMINATION:

VITAL SIGNS:  Blood pressure 148/68, pulse rate of 97, respiratory rate of

20, temperature is 37.3, saturating 95% on BiPAP, FiO2 100%.

GENERAL:  The patient is awake, alert, oriented x3, not in distress, speaks

few sentences, but no accessory muscle use.

HEAD AND NECK:  Atraumatic and normocephalic.  Normal pupils.  Full EOMs.  No

icterus.  Pink conjunctivae.

ENT:  Grossly normal.

NECK:  No JVD, no lymphadenopathy or thyromegaly.

HEART:  Normal rate.  Regular rhythm with S1, S2.  No murmurs.

LUNGS:  Positive bilateral wheezing and rhonchi bilaterally, mild to

moderate.

ABDOMEN:  Nondistended, normal bowel sounds, soft, nontender.

EXTREMITIES:  No bipedal edema noted.  No rashes.

NEUROLOGIC:  No gross focal motor or sensory deficits.

 

LABORATORY DATA:  White count of 14, hemoglobin 11, platelet count 309. 

Chemistries:  Sodium 128, potassium 5.0, BUN 24, creatinine is 0.89.  Lactic

acid 1.1.  ProBNP 3500.  INR 1.0.  Blood gas.  ABG pH 7.2, pCO2 of 85, pO2

47, bicarbonate 34.  Negative for flu.

 

IMAGING:  Chest x-ray per H&P.  Head CT; no acute findings.  Ankle x-ray

showing distal tibial and fibular fracture with bilateral subluxation of the

talus, soft tissue swelling and joint effusion.  Pelvic x-ray indeterminate

right pubic ring fracture, possibly chronic.

 

EKG:  Heart rate is 105, sinus tachycardia with PACs.

 

ASSESSMENT AND PLAN:  This is a very pleasant 80-year-old female with a

history of chronic obstructive pulmonary disease moderate, hypertension,

ulcerative colitis presenting with increasing shortness of breath and cough

times 2-3 days.

1.  Acute hypercapnic respiratory failure secondary to chronic obstructive

pulmonary disease exacerbation, likely secondary to bilateral basilar

pneumonia, rule out aspiration.  At this time, we will maintain the patient

on BiPAP, obtain a sputum Gram stain and cultures.  We will place on

Solu-Medrol 40 mg q. 6, nebulizers q. 4 hours around the clock and empiric

Zosyn and Levaquin.  Speech therapist will be consulted to rule out

aspiration and pulmonology will be consulted.  At this time, the patient does

not appear to be having any congestive heart failure exacerbation.  Appears

euvolemic, so we will monitor.

2.  Hyponatremia, likely hypovolemic.  We will check urine sodium osmolality.

 She is currently on normal saline set on 100 mL per hour.  We will check

sodium every 4 hours and will hold patient's Lasix for now, which she takes

20 mg b.i.d.

3.  Ankle fracture status post fall with.  Orthopedics consulted, icepacks

and tramadol, PT, OT.

4.  Possible pelvic ring fracture, orthopedics consulted.

5.  Hypertension.  Blood pressure elevated, likely from respiratory distress.

 Continue lisinopril.  We will order clonidine p.r.n.

6.  Ulcerative colitis, stable.  Continue Pentasa.

7.  Depression.  Continue Celexa.

8.  Deep venous thrombosis prophylaxis, heparin.  Full code per patient.

9.  Disposition:  Pending, lives with family.  Needs PT, OT evaluation,

already has oxygen at home which she uses 24/7.

10.  Follow up with PCP on discharge, Dr. Juarez.

 

 

 

[facility] Patient/Caregiver provided printed discharge information.

## 2019-12-18 NOTE — DISCHARGE NOTE NURSING/CASE MANAGEMENT/SOCIAL WORK - PATIENT PORTAL LINK FT
You can access the FollowMyHealth Patient Portal offered by [facility] by registering at the following website: [email or web address]. By joining [employer]’s FollowMyHealth portal, you will also be able to view your health information using other applications (apps) compatible with our system.

## 2019-12-18 NOTE — DISCHARGE NOTE PROVIDER - PROVIDER TOKENS
FREE:[LAST:[Guerra],FIRST:[Juve],PHONE:[phone]],FAX:[phone]   [phone]    -],ADDRESS:[[address] [place] [address]],ESTABLISHEDPATIENT:[T]]

## 2019-12-18 NOTE — DISCHARGE NOTE PROVIDER - NSDCFUSCHEDAPPT_GEN_ALL_CORE_FT
ALEXANDR GERARD ; 01/27/2020 ; [facility] [address]  ALEXANDR GERARD ; 03/04/2020 ; [facility] [address] ALEXANDR GERARD ; 01/27/2020 ; [facility] [address]  ALEXANDR GERARD ; 03/04/2020 ; [facility] [address]

## 2019-12-18 NOTE — DISCHARGE NOTE PROVIDER - HOSPITAL COURSE
This is an 78 y/o male with h/o HTN, HLD, CLL currently undergoing treatment with IV Ig (s/p chemo x 6 treatments), and new diagnosis of Atrial fibrillation on Eliquis.  Patient needs cardiac clearance for left eye surgery.  His nuclear stress test was abnormal revealing a small to medium sized area of ischemia involving the inferior wall and apex.  He denies chest pain.  He c/o CANNON. He had a C which showed This is an 80 y/o male with h/o HTN, HLD, CLL currently undergoing treatment with IV Ig (s/p chemo x 6 treatments), and new diagnosis of Atrial fibrillation on Eliquis.  Patient needs cardiac clearance for left eye surgery.  His nuclear stress test was abnormal revealing a small to medium sized area of ischemia involving the inferior wall and apex.  He denies chest pain.  He c/o CANNON. He had a LHC which showed:          LM: Normal         LAD: 40-50% mid stenosis (iFR 0.95)         LCX: Normal         RCA: 50% RPL stenosis

## 2019-12-18 NOTE — DISCHARGE NOTE PROVIDER - CARE PROVIDER_API CALL
Juve Guerra  [address] [place] [address]  Phone: [phone]  Fax: (   )    -  Established Patient  Follow Up Time:

## 2019-12-18 NOTE — PROGRESS NOTE ADULT - SUBJECTIVE AND OBJECTIVE BOX
Department of Cardiology                                                                  [facility] [address] [place] [address]                                                            Telephone: [phone]. Fax:[phone]                                                                           Cardiac Catheterization Note       Subjective:  79y  Male who had a left heart catheterization (official report pending) which showed:       LM: Normal       LAD: 40-50% mid stenosis (iFR 0.95)       LCX: Normal       RCA: 50% RPL stenosis    PAST MEDICAL & SURGICAL HISTORY:  Atrial fibrillation: new diagnosis (within 3 months)  Hypoglobulinemia  Lymphoma, small lymphocytic: HCC  Glaucoma  Osteoarthritis  HLD (hyperlipidemia)  Gastritis and gastroduodenitis  HTN (hypertension)  BPH (benign prostatic hyperplasia)  Chronic lymphoid leukemia  H/O sinus surgery  S/P hip replacement: right hip x 1; left hip x 2  S/P cataract surgery: bilateral    FAMILY HISTORY:  No pertinent family history in first degree relatives    Home Medications:  azelastine 137 mcg/inh (0.1%) nasal spray: 2 spray(s) nasal 2 times a day, As Needed (18 Dec 2019 09:21)  dorzolamide 2% ophthalmic solution: 1 drop(s) in each eye 3 times a day (18 Dec 2019 09:21)  Eliquis 5 mg oral tablet: 1 tab(s) orally 2 times a day (18 Dec 2019 09:21)  erythromycin 0.5% ophthalmic ointment: 1 application to each affected eye 4 times a day (18 Dec 2019 09:21)  finasteride 5 mg oral tablet: 1 tab(s) orally once a day (18 Dec 2019 09:21)  glimepiride 2 mg oral tablet: 1 tab(s) orally once a day (18 Dec 2019 09:21)  ivig: Apply topically to affected area every 30 days (18 Dec 2019 09:21)  latanoprost 0.005% ophthalmic solution: 1 drop(s) in each eye once a day (in the evening) (18 Dec 2019 09:21)  lisinopril 10 mg oral tablet: 1 tab(s) orally once a day (18 Dec 2019 09:21)  metFORMIN 1000 mg oral tablet: 1 tab(s) orally 2 times a day (18 Dec 2019 09:21)  metoprolol tartrate 50 mg oral tablet: 1 tab(s) orally 2 times a day (18 Dec 2019 09:21)  mupirocin 2% topical ointment: Apply topically to affected area once a day (18 Dec 2019 09:21)  pilocarpine nitrate 2% ophthalmic solution: 1 drop(s) in the left eye 4 times a day (18 Dec 2019 09:21)  simvastatin 20 mg oral tablet: 1 tab(s) orally once a day (at bedtime) (18 Dec 2019 09:21)  tamsulosin 0.4 mg oral capsule: 1 cap(s) orally once a day (18 Dec 2019 09:21)  timolol maleate 0.5% ophthalmic solution: 1 drop(s) in each eye 2 times a day (18 Dec 2019 09:21)    HPI: This is an 78 y/o male with h/o HTN, HLD, CLL currently undergoing treatment with IV Ig (s/p chemo x 6 treatments), and new diagnosis of Atrial fibrillation on Eliquis.  Patient needs cardiac clearance for left eye surgery.  His nuclear stress test was abnormal revealing a small to medium sized area of ischemia involving the inferior wall and apex. He denies chest pain.  He c/o CANNON.     General: No fatigue, no fevers/chills  Respiratory: No dyspnea, no cough, no wheeze  CV: No chest pain, no palpitations  Abd: No nausea  Neuro: No headache, no dizziness    No Known Allergies    Objective:  Vital Signs Last 24 Hrs  T(C): 36.9 (18 Dec 2019 09:26), Max: 36.9 (18 Dec 2019 09:26)  T(F): 98.4 (18 Dec 2019 09:26), Max: 98.4 (18 Dec 2019 09:26)  HR: 99 (18 Dec 2019 09:26) (99 - 99)  BP: 166/96 (18 Dec 2019 09:26) (166/96 - 166/96)  RR: 20 (18 Dec 2019 09:26) (20 - 20)  SpO2: 95% (18 Dec 2019 09:26) (95% - 95%)    CM: SR  Neuro: A&OX3, CN 2-12 intact  HEENT: NC, AT  Lungs: CTA B/L  CV: S1, S2, no murmur, RRR  Abd: Soft  Extremity: Right radial band: no bleeding, fingers warm with good cap refil

## 2019-12-21 RX ORDER — METFORMIN HYDROCHLORIDE 850 MG/1
1 TABLET ORAL
Qty: 0 | Refills: 0 | DISCHARGE
Start: 2019-12-21

## 2020-02-20 ENCOUNTER — APPOINTMENT (OUTPATIENT)
Dept: OPHTHALMOLOGY | Facility: CLINIC | Age: 80
End: 2020-02-20
Payer: MEDICARE

## 2020-02-20 ENCOUNTER — NON-APPOINTMENT (OUTPATIENT)
Age: 80
End: 2020-02-20

## 2020-02-20 PROBLEM — E78.5 HYPERLIPIDEMIA, UNSPECIFIED: Chronic | Status: ACTIVE | Noted: 2019-12-12

## 2020-02-20 PROBLEM — M19.90 UNSPECIFIED OSTEOARTHRITIS, UNSPECIFIED SITE: Chronic | Status: ACTIVE | Noted: 2019-12-12

## 2020-02-20 PROBLEM — N40.0 BENIGN PROSTATIC HYPERPLASIA WITHOUT LOWER URINARY TRACT SYMPTOMS: Chronic | Status: ACTIVE | Noted: 2019-12-12

## 2020-02-20 PROBLEM — C83.00 SMALL CELL B-CELL LYMPHOMA, UNSPECIFIED SITE: Chronic | Status: ACTIVE | Noted: 2019-12-12

## 2020-02-20 PROBLEM — H40.9 UNSPECIFIED GLAUCOMA: Chronic | Status: ACTIVE | Noted: 2019-12-12

## 2020-02-20 PROBLEM — K29.70 GASTRITIS, UNSPECIFIED, WITHOUT BLEEDING: Chronic | Status: ACTIVE | Noted: 2019-12-12

## 2020-02-20 PROBLEM — R77.1 ABNORMALITY OF GLOBULIN: Chronic | Status: ACTIVE | Noted: 2019-12-12

## 2020-02-20 PROBLEM — I10 ESSENTIAL (PRIMARY) HYPERTENSION: Chronic | Status: ACTIVE | Noted: 2019-12-12

## 2020-02-20 PROBLEM — I48.91 UNSPECIFIED ATRIAL FIBRILLATION: Chronic | Status: ACTIVE | Noted: 2019-12-12

## 2020-02-20 PROBLEM — C91.10 CHRONIC LYMPHOCYTIC LEUKEMIA OF B-CELL TYPE NOT HAVING ACHIEVED REMISSION: Chronic | Status: ACTIVE | Noted: 2019-12-12

## 2020-02-20 PROCEDURE — 92012 INTRM OPH EXAM EST PATIENT: CPT

## 2020-03-04 ENCOUNTER — APPOINTMENT (OUTPATIENT)
Dept: PULMONOLOGY | Facility: CLINIC | Age: 80
End: 2020-03-04

## 2020-04-16 ENCOUNTER — RX CHANGE (OUTPATIENT)
Age: 80
End: 2020-04-16

## 2020-05-12 NOTE — DISCUSSION/SUMMARY
Detail Level: Detailed
[FreeTextEntry1] : CLL /SLL with low IgG,  on Chemo and IVIG\par CTY scan with resolved RLL infiltrate 2/19, stable atelectasis 6/19 no new suspicious abnormality\par never smoker, normal spirometry, no pulmonary complaints\par ? recurrent nocturnal aspiration, doesn’t want sleep study, precautions stressed\par Has a fib, on eliquis, followed by Cardiology\par Vaccinations per Oncology\par 6-8  months or sooner if needed

## 2020-05-21 ENCOUNTER — APPOINTMENT (OUTPATIENT)
Dept: OPHTHALMOLOGY | Facility: CLINIC | Age: 80
End: 2020-05-21

## 2021-03-16 ENCOUNTER — APPOINTMENT (OUTPATIENT)
Dept: OPHTHALMOLOGY | Facility: CLINIC | Age: 81
End: 2021-03-16

## 2021-04-15 ENCOUNTER — NON-APPOINTMENT (OUTPATIENT)
Age: 81
End: 2021-04-15

## 2021-04-15 ENCOUNTER — APPOINTMENT (OUTPATIENT)
Dept: OPHTHALMOLOGY | Facility: CLINIC | Age: 81
End: 2021-04-15
Payer: MEDICARE

## 2021-04-15 PROCEDURE — 99072 ADDL SUPL MATRL&STAF TM PHE: CPT

## 2021-04-15 PROCEDURE — 92014 COMPRE OPH EXAM EST PT 1/>: CPT

## 2021-04-15 PROCEDURE — 92133 CPTRZD OPH DX IMG PST SGM ON: CPT

## 2021-04-21 ENCOUNTER — NON-APPOINTMENT (OUTPATIENT)
Age: 81
End: 2021-04-21

## 2021-04-21 ENCOUNTER — APPOINTMENT (OUTPATIENT)
Dept: OPHTHALMOLOGY | Facility: CLINIC | Age: 81
End: 2021-04-21
Payer: MEDICARE

## 2021-04-21 PROCEDURE — 99072 ADDL SUPL MATRL&STAF TM PHE: CPT

## 2021-04-21 PROCEDURE — 92012 INTRM OPH EXAM EST PATIENT: CPT

## 2021-05-05 ENCOUNTER — APPOINTMENT (OUTPATIENT)
Dept: OPHTHALMOLOGY | Facility: CLINIC | Age: 81
End: 2021-05-05
Payer: MEDICARE

## 2021-05-05 ENCOUNTER — NON-APPOINTMENT (OUTPATIENT)
Age: 81
End: 2021-05-05

## 2021-05-05 PROCEDURE — 99213 OFFICE O/P EST LOW 20 MIN: CPT

## 2021-05-05 PROCEDURE — 99072 ADDL SUPL MATRL&STAF TM PHE: CPT

## 2021-06-16 ENCOUNTER — NON-APPOINTMENT (OUTPATIENT)
Age: 81
End: 2021-06-16

## 2021-06-16 ENCOUNTER — APPOINTMENT (OUTPATIENT)
Dept: OPHTHALMOLOGY | Facility: CLINIC | Age: 81
End: 2021-06-16
Payer: MEDICARE

## 2021-06-16 PROCEDURE — 92012 INTRM OPH EXAM EST PATIENT: CPT

## 2021-07-07 ENCOUNTER — RESULT REVIEW (OUTPATIENT)
Age: 81
End: 2021-07-07

## 2021-09-24 ENCOUNTER — APPOINTMENT (OUTPATIENT)
Dept: OPHTHALMOLOGY | Facility: CLINIC | Age: 81
End: 2021-09-24
Payer: MEDICARE

## 2021-09-24 ENCOUNTER — NON-APPOINTMENT (OUTPATIENT)
Age: 81
End: 2021-09-24

## 2021-09-24 PROCEDURE — 92012 INTRM OPH EXAM EST PATIENT: CPT

## 2021-11-09 ENCOUNTER — APPOINTMENT (OUTPATIENT)
Dept: OPHTHALMOLOGY | Facility: CLINIC | Age: 81
End: 2021-11-09
Payer: MEDICARE

## 2021-11-09 ENCOUNTER — NON-APPOINTMENT (OUTPATIENT)
Age: 81
End: 2021-11-09

## 2021-11-09 PROCEDURE — 92012 INTRM OPH EXAM EST PATIENT: CPT

## 2021-11-11 ENCOUNTER — APPOINTMENT (OUTPATIENT)
Dept: DISASTER EMERGENCY | Facility: CLINIC | Age: 81
End: 2021-11-11

## 2021-11-11 DIAGNOSIS — Z01.818 ENCOUNTER FOR OTHER PREPROCEDURAL EXAMINATION: ICD-10-CM

## 2021-11-12 LAB — SARS-COV-2 N GENE NPH QL NAA+PROBE: NOT DETECTED

## 2021-11-14 ENCOUNTER — TRANSCRIPTION ENCOUNTER (OUTPATIENT)
Age: 81
End: 2021-11-14

## 2021-11-15 ENCOUNTER — OUTPATIENT (OUTPATIENT)
Dept: OUTPATIENT SERVICES | Facility: HOSPITAL | Age: 81
LOS: 1 days | End: 2021-11-15
Payer: COMMERCIAL

## 2021-11-15 ENCOUNTER — APPOINTMENT (OUTPATIENT)
Dept: OPHTHALMOLOGY | Facility: HOSPITAL | Age: 81
End: 2021-11-15

## 2021-11-15 VITALS
HEART RATE: 85 BPM | DIASTOLIC BLOOD PRESSURE: 74 MMHG | SYSTOLIC BLOOD PRESSURE: 111 MMHG | RESPIRATION RATE: 20 BRPM | OXYGEN SATURATION: 100 %

## 2021-11-15 VITALS
SYSTOLIC BLOOD PRESSURE: 152 MMHG | OXYGEN SATURATION: 100 % | HEART RATE: 84 BPM | HEIGHT: 66 IN | TEMPERATURE: 98 F | WEIGHT: 188.72 LBS | RESPIRATION RATE: 24 BRPM | DIASTOLIC BLOOD PRESSURE: 81 MMHG

## 2021-11-15 DIAGNOSIS — H40.1132 PRIMARY OPEN-ANGLE GLAUCOMA, BILATERAL, MODERATE STAGE: ICD-10-CM

## 2021-11-15 DIAGNOSIS — Z96.649 PRESENCE OF UNSPECIFIED ARTIFICIAL HIP JOINT: Chronic | ICD-10-CM

## 2021-11-15 DIAGNOSIS — Z98.49 CATARACT EXTRACTION STATUS, UNSPECIFIED EYE: Chronic | ICD-10-CM

## 2021-11-15 DIAGNOSIS — Z98.890 OTHER SPECIFIED POSTPROCEDURAL STATES: Chronic | ICD-10-CM

## 2021-11-15 LAB — GLUCOSE BLDC GLUCOMTR-MCNC: 149 MG/DL — HIGH (ref 70–99)

## 2021-11-15 PROCEDURE — C1889: CPT

## 2021-11-15 PROCEDURE — 82962 GLUCOSE BLOOD TEST: CPT

## 2021-11-15 PROCEDURE — 66710 CILIARY TRANSSLERAL THERAPY: CPT | Mod: LT

## 2021-11-15 RX ORDER — ERYTHROMYCIN BASE 5 MG/GRAM
1 OINTMENT (GRAM) OPHTHALMIC (EYE)
Qty: 0 | Refills: 0 | DISCHARGE

## 2021-11-15 RX ORDER — LATANOPROST 0.05 MG/ML
1 SOLUTION/ DROPS OPHTHALMIC; TOPICAL
Qty: 0 | Refills: 0 | DISCHARGE

## 2021-11-15 RX ORDER — TIMOLOL 0.5 %
1 DROPS OPHTHALMIC (EYE)
Qty: 0 | Refills: 0 | DISCHARGE

## 2021-11-15 RX ORDER — DORZOLAMIDE HYDROCHLORIDE 20 MG/ML
1 SOLUTION/ DROPS OPHTHALMIC
Qty: 0 | Refills: 0 | DISCHARGE

## 2021-11-15 RX ORDER — PILOCARPINE HCL 4 %
1 DROPS OPHTHALMIC (EYE)
Qty: 0 | Refills: 0 | DISCHARGE

## 2021-11-15 NOTE — ASU DISCHARGE PLAN (ADULT/PEDIATRIC) - PROVIDER TOKENS
PROVIDER:[TOKEN:[[id number]],SCHEDULEDAPPT:[11/16/2021],ESTABLISHEDPATIENT:[T]] PROVIDER:[TOKEN:[[id number]],SCHEDULEDAPPT:[11/16/2021],SCHEDULEDAPPTTIME:[10:00 AM],ESTABLISHEDPATIENT:[T]]

## 2021-11-15 NOTE — ASU PREOP CHECKLIST - MEDICAL/PEDIATRIC CLEARANCE ON MEDICAL RECORD
Well-Child Checkup: 15 to 25 Years     Stay involved in your teen’s life. Make sure your teen knows you’re always there when he or she needs to talk. During the teen years, it’s important to keep having yearly checkups.  Your teen may be embarrassed abo · Body changes. The body grows and matures during puberty. Hair will grow in the pubic area and on other parts of the body. Girls grow breasts and menstruate (have monthly periods). A boy’s voice changes, becoming lower and deeper.  As the penis matures, er · Eat healthy. Your child should eat fruits, vegetables, lean meats, and whole grains every day. Less healthy foods—like french fries, candy, and chips—should be eaten rarely.  Some teens fall into the trap of snacking on junk food and fast food throughout · Encourage your teen to keep a consistent bedtime, even on weekends. Sleeping is easier when the body follows a routine. Don’t let your teen stay up too late at night or sleep in too long in the morning. · Help your teen wake up, if needed.  Go into the b · Set rules and limits around driving and use of the car. If your teen gets a ticket or has an accident, there should be consequences. Driving is a privilege that can be taken away if your child doesn’t follow the rules.   · Teach your child to make good de © 5896-5507 [employer] [address]. [address] [facility] [place]. All rights reserved. This information is not intended as a substitute for professional medical care. Always follow your healthcare professional's instructions. on  chart

## 2021-11-15 NOTE — ASU PATIENT PROFILE, ADULT - NSICDXPASTSURGICALHX_GEN_ALL_CORE_FT
PAST SURGICAL HISTORY:  H/O sinus surgery     S/P cataract surgery bilateral    S/P hip replacement right hip x 1; left hip x 2

## 2021-11-15 NOTE — ASU PATIENT PROFILE, ADULT - NSICDXPASTMEDICALHX_GEN_ALL_CORE_FT
PAST MEDICAL HISTORY:  Atrial fibrillation new diagnosis (within 3 months)    BPH (benign prostatic hyperplasia)     Chronic lymphoid leukemia     DM2 (diabetes mellitus, type 2)     Gastritis and gastroduodenitis     Glaucoma     HLD (hyperlipidemia)     HTN (hypertension)     Hypoglobulinemia     Lymphoma, small lymphocytic HCC    Monoclonal gammopathy of unknown significance (MGUS)     Osteoarthritis

## 2021-11-15 NOTE — ASU DISCHARGE PLAN (ADULT/PEDIATRIC) - CARE PROVIDER_API CALL
Joel Richards)  Ophthalmology  [address] [place] [address]  Phone: [phone]  Fax: [phone]  Established Patient  Scheduled Appointment: 11/16/2021   Joel Richards)  Ophthalmology  [address] [place] [address]  Phone: [phone]  Fax: [phone]  Established Patient  Scheduled Appointment: 11/16/2021 10:00 AM

## 2021-11-16 ENCOUNTER — NON-APPOINTMENT (OUTPATIENT)
Age: 81
End: 2021-11-16

## 2021-11-16 ENCOUNTER — APPOINTMENT (OUTPATIENT)
Dept: OPHTHALMOLOGY | Facility: CLINIC | Age: 81
End: 2021-11-16
Payer: MEDICARE

## 2021-11-16 PROBLEM — D47.2 MONOCLONAL GAMMOPATHY: Chronic | Status: ACTIVE | Noted: 2021-11-15

## 2021-11-16 PROBLEM — E11.9 TYPE 2 DIABETES MELLITUS WITHOUT COMPLICATIONS: Chronic | Status: ACTIVE | Noted: 2021-11-15

## 2021-11-16 PROCEDURE — 99024 POSTOP FOLLOW-UP VISIT: CPT

## 2021-11-18 ENCOUNTER — APPOINTMENT (OUTPATIENT)
Dept: OPHTHALMOLOGY | Facility: CLINIC | Age: 81
End: 2021-11-18
Payer: MEDICARE

## 2021-11-18 ENCOUNTER — NON-APPOINTMENT (OUTPATIENT)
Age: 81
End: 2021-11-18

## 2021-11-18 PROCEDURE — 99024 POSTOP FOLLOW-UP VISIT: CPT

## 2022-01-05 ENCOUNTER — NON-APPOINTMENT (OUTPATIENT)
Age: 82
End: 2022-01-05

## 2022-01-05 ENCOUNTER — APPOINTMENT (OUTPATIENT)
Dept: OPHTHALMOLOGY | Facility: CLINIC | Age: 82
End: 2022-01-05
Payer: MEDICARE

## 2022-01-05 PROCEDURE — 99024 POSTOP FOLLOW-UP VISIT: CPT

## 2022-02-16 ENCOUNTER — APPOINTMENT (OUTPATIENT)
Dept: OPHTHALMOLOGY | Facility: CLINIC | Age: 82
End: 2022-02-16
Payer: MEDICARE

## 2022-02-16 ENCOUNTER — NON-APPOINTMENT (OUTPATIENT)
Age: 82
End: 2022-02-16

## 2022-02-16 PROCEDURE — 92012 INTRM OPH EXAM EST PATIENT: CPT

## 2022-03-16 ENCOUNTER — APPOINTMENT (OUTPATIENT)
Dept: OPHTHALMOLOGY | Facility: CLINIC | Age: 82
End: 2022-03-16
Payer: MEDICARE

## 2022-03-16 ENCOUNTER — NON-APPOINTMENT (OUTPATIENT)
Age: 82
End: 2022-03-16

## 2022-03-16 PROCEDURE — 92012 INTRM OPH EXAM EST PATIENT: CPT

## 2022-05-25 ENCOUNTER — APPOINTMENT (OUTPATIENT)
Dept: OPHTHALMOLOGY | Facility: CLINIC | Age: 82
End: 2022-05-25
Payer: MEDICARE

## 2022-05-25 ENCOUNTER — NON-APPOINTMENT (OUTPATIENT)
Age: 82
End: 2022-05-25

## 2022-05-25 PROCEDURE — 92012 INTRM OPH EXAM EST PATIENT: CPT

## 2022-08-24 ENCOUNTER — APPOINTMENT (OUTPATIENT)
Dept: OPHTHALMOLOGY | Facility: CLINIC | Age: 82
End: 2022-08-24

## 2022-08-24 ENCOUNTER — NON-APPOINTMENT (OUTPATIENT)
Age: 82
End: 2022-08-24

## 2022-08-24 PROCEDURE — 92014 COMPRE OPH EXAM EST PT 1/>: CPT

## 2022-08-24 PROCEDURE — 92133 CPTRZD OPH DX IMG PST SGM ON: CPT

## 2022-09-19 ENCOUNTER — TRANSCRIPTION ENCOUNTER (OUTPATIENT)
Age: 82
End: 2022-09-19

## 2022-09-19 NOTE — ASU PATIENT PROFILE, ADULT - VISION (WITH CORRECTIVE LENSES IF THE PATIENT USUALLY WEARS THEM):
Left eye no vision/Partially impaired: cannot see medication labels or newsprint, but can see obstacles in path, and the surrounding layout; can count fingers at arm's length

## 2022-09-19 NOTE — ASU PATIENT PROFILE, ADULT - NSICDXPASTSURGICALHX_GEN_ALL_CORE_FT
PAST SURGICAL HISTORY:  H/O sinus surgery     S/P cataract surgery bilateral    S/P hip replacement right hip x 1; left hip x 2    Status post eye surgery left eye

## 2022-09-20 ENCOUNTER — TRANSCRIPTION ENCOUNTER (OUTPATIENT)
Age: 82
End: 2022-09-20

## 2022-09-20 ENCOUNTER — OUTPATIENT (OUTPATIENT)
Dept: OUTPATIENT SERVICES | Facility: HOSPITAL | Age: 82
LOS: 1 days | End: 2022-09-20
Payer: COMMERCIAL

## 2022-09-20 ENCOUNTER — APPOINTMENT (OUTPATIENT)
Dept: OPHTHALMOLOGY | Facility: HOSPITAL | Age: 82
End: 2022-09-20

## 2022-09-20 VITALS
RESPIRATION RATE: 19 BRPM | SYSTOLIC BLOOD PRESSURE: 131 MMHG | HEART RATE: 77 BPM | WEIGHT: 190.04 LBS | TEMPERATURE: 98 F | HEIGHT: 66 IN | DIASTOLIC BLOOD PRESSURE: 74 MMHG | OXYGEN SATURATION: 100 %

## 2022-09-20 VITALS
HEART RATE: 77 BPM | SYSTOLIC BLOOD PRESSURE: 104 MMHG | RESPIRATION RATE: 18 BRPM | OXYGEN SATURATION: 95 % | DIASTOLIC BLOOD PRESSURE: 69 MMHG

## 2022-09-20 DIAGNOSIS — Z98.890 OTHER SPECIFIED POSTPROCEDURAL STATES: Chronic | ICD-10-CM

## 2022-09-20 DIAGNOSIS — Z96.649 PRESENCE OF UNSPECIFIED ARTIFICIAL HIP JOINT: Chronic | ICD-10-CM

## 2022-09-20 DIAGNOSIS — Z98.49 CATARACT EXTRACTION STATUS, UNSPECIFIED EYE: Chronic | ICD-10-CM

## 2022-09-20 DIAGNOSIS — H40.1132 PRIMARY OPEN-ANGLE GLAUCOMA, BILATERAL, MODERATE STAGE: ICD-10-CM

## 2022-09-20 LAB — GLUCOSE BLDC GLUCOMTR-MCNC: 163 MG/DL — HIGH (ref 70–99)

## 2022-09-20 PROCEDURE — 66710 CILIARY TRANSSLERAL THERAPY: CPT | Mod: LT

## 2022-09-20 PROCEDURE — 82962 GLUCOSE BLOOD TEST: CPT

## 2022-09-20 PROCEDURE — C1889: CPT

## 2022-09-20 DEVICE — PROBE G LASER ILLUMINATE SNGL: Type: IMPLANTABLE DEVICE | Site: LEFT | Status: FUNCTIONAL

## 2022-09-20 RX ORDER — SIMVASTATIN 20 MG/1
1 TABLET, FILM COATED ORAL
Qty: 0 | Refills: 0 | DISCHARGE

## 2022-09-20 RX ORDER — METOPROLOL TARTRATE 50 MG
1 TABLET ORAL
Qty: 0 | Refills: 0 | DISCHARGE

## 2022-09-20 RX ORDER — FINASTERIDE 5 MG/1
1 TABLET, FILM COATED ORAL
Qty: 0 | Refills: 0 | DISCHARGE

## 2022-09-20 RX ORDER — APIXABAN 2.5 MG/1
1 TABLET, FILM COATED ORAL
Qty: 0 | Refills: 0 | DISCHARGE

## 2022-09-20 RX ORDER — MUPIROCIN 20 MG/G
1 OINTMENT TOPICAL
Qty: 0 | Refills: 0 | DISCHARGE

## 2022-09-20 RX ORDER — LISINOPRIL 2.5 MG/1
1 TABLET ORAL
Qty: 0 | Refills: 0 | DISCHARGE

## 2022-09-20 RX ORDER — AZELASTINE 137 UG/1
2 SPRAY, METERED NASAL
Qty: 0 | Refills: 0 | DISCHARGE

## 2022-09-20 RX ORDER — TAMSULOSIN HYDROCHLORIDE 0.4 MG/1
1 CAPSULE ORAL
Qty: 0 | Refills: 0 | DISCHARGE

## 2022-09-20 RX ORDER — GLIMEPIRIDE 1 MG
1 TABLET ORAL
Qty: 0 | Refills: 0 | DISCHARGE

## 2022-09-20 NOTE — ASU DISCHARGE PLAN (ADULT/PEDIATRIC) - NS MD DC FALL RISK RISK
For information on Fall & Injury Prevention, visit: [email or web address] OR  [email or web address] OR  https://www.cdc.gov/steadi/patient.html

## 2022-09-20 NOTE — ASU DISCHARGE PLAN (ADULT/PEDIATRIC) - CARE PROVIDER_API CALL
Joel Richards)  Ophthalmology  [address] [place] [address]  Phone: [phone]  Fax: [phone]  Scheduled Appointment: 09/21/2022

## 2022-09-21 ENCOUNTER — APPOINTMENT (OUTPATIENT)
Dept: OPHTHALMOLOGY | Facility: CLINIC | Age: 82
End: 2022-09-21

## 2022-09-21 ENCOUNTER — NON-APPOINTMENT (OUTPATIENT)
Age: 82
End: 2022-09-21

## 2022-09-21 PROCEDURE — 99024 POSTOP FOLLOW-UP VISIT: CPT

## 2022-09-28 ENCOUNTER — NON-APPOINTMENT (OUTPATIENT)
Age: 82
End: 2022-09-28

## 2022-09-28 ENCOUNTER — APPOINTMENT (OUTPATIENT)
Dept: OPHTHALMOLOGY | Facility: CLINIC | Age: 82
End: 2022-09-28

## 2022-09-28 PROCEDURE — 99024 POSTOP FOLLOW-UP VISIT: CPT

## 2022-10-03 NOTE — ASU DISCHARGE PLAN (ADULT/PEDIATRIC) - PROCEDURE
Left very detailed message for patient. Told to please call back with any questions.      111 Cyclophotocoagulation Diode Laser, Left eye Cyclophotocoagulation Left eye

## 2022-10-05 ENCOUNTER — APPOINTMENT (OUTPATIENT)
Dept: OPHTHALMOLOGY | Facility: CLINIC | Age: 82
End: 2022-10-05

## 2022-10-05 ENCOUNTER — NON-APPOINTMENT (OUTPATIENT)
Age: 82
End: 2022-10-05

## 2022-10-05 PROCEDURE — 99024 POSTOP FOLLOW-UP VISIT: CPT

## 2022-10-19 ENCOUNTER — NON-APPOINTMENT (OUTPATIENT)
Age: 82
End: 2022-10-19

## 2022-10-19 ENCOUNTER — APPOINTMENT (OUTPATIENT)
Dept: OPHTHALMOLOGY | Facility: CLINIC | Age: 82
End: 2022-10-19

## 2022-10-19 PROCEDURE — 99024 POSTOP FOLLOW-UP VISIT: CPT

## 2022-12-07 ENCOUNTER — APPOINTMENT (OUTPATIENT)
Dept: OPHTHALMOLOGY | Facility: CLINIC | Age: 82
End: 2022-12-07

## 2022-12-07 ENCOUNTER — NON-APPOINTMENT (OUTPATIENT)
Age: 82
End: 2022-12-07

## 2022-12-07 PROCEDURE — 99024 POSTOP FOLLOW-UP VISIT: CPT

## 2022-12-15 NOTE — ASU PATIENT PROFILE, ADULT - PRESSURE ULCER(S)
Caller: GOLDEN     Relationship: [facility]     Best call back number: [phone]    What is your medical concern? URINANALYSIS SHOWED ECOLI AND AMEDYSIS CALLING TO FOLLOW UP ABOUT THE RESULTS. SHE WAS TRYING TO GET AN ANTIBIOTIC SENT. PLEASE ADVISE.    no

## 2023-04-05 ENCOUNTER — NON-APPOINTMENT (OUTPATIENT)
Age: 83
End: 2023-04-05

## 2023-04-05 ENCOUNTER — APPOINTMENT (OUTPATIENT)
Dept: OPHTHALMOLOGY | Facility: CLINIC | Age: 83
End: 2023-04-05
Payer: MEDICARE

## 2023-04-05 PROCEDURE — 92012 INTRM OPH EXAM EST PATIENT: CPT

## 2023-04-26 ENCOUNTER — APPOINTMENT (OUTPATIENT)
Dept: OPHTHALMOLOGY | Facility: CLINIC | Age: 83
End: 2023-04-26
Payer: MEDICARE

## 2023-04-26 ENCOUNTER — NON-APPOINTMENT (OUTPATIENT)
Age: 83
End: 2023-04-26

## 2023-04-26 PROCEDURE — 92012 INTRM OPH EXAM EST PATIENT: CPT

## 2023-05-17 ENCOUNTER — APPOINTMENT (OUTPATIENT)
Dept: OPHTHALMOLOGY | Facility: CLINIC | Age: 83
End: 2023-05-17
Payer: MEDICARE

## 2023-05-17 ENCOUNTER — NON-APPOINTMENT (OUTPATIENT)
Age: 83
End: 2023-05-17

## 2023-05-17 PROCEDURE — 92012 INTRM OPH EXAM EST PATIENT: CPT

## 2023-07-14 ENCOUNTER — NON-APPOINTMENT (OUTPATIENT)
Age: 83
End: 2023-07-14

## 2023-07-14 ENCOUNTER — APPOINTMENT (OUTPATIENT)
Dept: OPHTHALMOLOGY | Facility: CLINIC | Age: 83
End: 2023-07-14
Payer: MEDICARE

## 2023-07-14 PROCEDURE — 92012 INTRM OPH EXAM EST PATIENT: CPT

## 2023-11-01 ENCOUNTER — NON-APPOINTMENT (OUTPATIENT)
Age: 83
End: 2023-11-01

## 2023-11-01 ENCOUNTER — APPOINTMENT (OUTPATIENT)
Dept: OPHTHALMOLOGY | Facility: CLINIC | Age: 83
End: 2023-11-01
Payer: MEDICARE

## 2023-11-01 PROCEDURE — 92012 INTRM OPH EXAM EST PATIENT: CPT

## 2024-02-07 ENCOUNTER — APPOINTMENT (OUTPATIENT)
Dept: OPHTHALMOLOGY | Facility: CLINIC | Age: 84
End: 2024-02-07
Payer: MEDICARE

## 2024-02-07 ENCOUNTER — NON-APPOINTMENT (OUTPATIENT)
Age: 84
End: 2024-02-07

## 2024-02-07 PROCEDURE — 92012 INTRM OPH EXAM EST PATIENT: CPT

## 2024-05-15 ENCOUNTER — APPOINTMENT (OUTPATIENT)
Dept: OPHTHALMOLOGY | Facility: CLINIC | Age: 84
End: 2024-05-15
Payer: MEDICARE

## 2024-05-15 ENCOUNTER — NON-APPOINTMENT (OUTPATIENT)
Age: 84
End: 2024-05-15

## 2024-05-15 PROCEDURE — 92012 INTRM OPH EXAM EST PATIENT: CPT

## 2024-08-13 ENCOUNTER — NON-APPOINTMENT (OUTPATIENT)
Age: 84
End: 2024-08-13

## 2024-08-13 ENCOUNTER — APPOINTMENT (OUTPATIENT)
Dept: OPHTHALMOLOGY | Facility: CLINIC | Age: 84
End: 2024-08-13
Payer: MEDICARE

## 2024-08-13 PROCEDURE — 92133 CPTRZD OPH DX IMG PST SGM ON: CPT

## 2024-08-13 PROCEDURE — 92012 INTRM OPH EXAM EST PATIENT: CPT

## 2024-10-02 ENCOUNTER — NON-APPOINTMENT (OUTPATIENT)
Age: 84
End: 2024-10-02

## 2024-10-02 ENCOUNTER — APPOINTMENT (OUTPATIENT)
Dept: OPHTHALMOLOGY | Facility: CLINIC | Age: 84
End: 2024-10-02
Payer: MEDICARE

## 2024-10-02 PROCEDURE — 92012 INTRM OPH EXAM EST PATIENT: CPT

## 2024-12-24 PROBLEM — F10.90 ALCOHOL USE: Status: ACTIVE | Noted: 2017-11-17

## 2025-01-15 ENCOUNTER — APPOINTMENT (OUTPATIENT)
Dept: OPHTHALMOLOGY | Facility: CLINIC | Age: 85
End: 2025-01-15
Payer: MEDICARE

## 2025-01-15 ENCOUNTER — NON-APPOINTMENT (OUTPATIENT)
Age: 85
End: 2025-01-15

## 2025-01-15 PROCEDURE — 92012 INTRM OPH EXAM EST PATIENT: CPT

## 2025-04-16 ENCOUNTER — APPOINTMENT (OUTPATIENT)
Dept: OPHTHALMOLOGY | Facility: CLINIC | Age: 85
End: 2025-04-16
Payer: MEDICARE

## 2025-04-16 ENCOUNTER — NON-APPOINTMENT (OUTPATIENT)
Age: 85
End: 2025-04-16

## 2025-04-16 PROCEDURE — 92012 INTRM OPH EXAM EST PATIENT: CPT

## 2025-05-21 ENCOUNTER — APPOINTMENT (OUTPATIENT)
Dept: OPHTHALMOLOGY | Facility: CLINIC | Age: 85
End: 2025-05-21
Payer: MEDICARE

## 2025-05-21 ENCOUNTER — NON-APPOINTMENT (OUTPATIENT)
Age: 85
End: 2025-05-21

## 2025-05-21 PROCEDURE — 92012 INTRM OPH EXAM EST PATIENT: CPT

## 2025-06-17 ENCOUNTER — APPOINTMENT (OUTPATIENT)
Dept: OPHTHALMOLOGY | Facility: CLINIC | Age: 85
End: 2025-06-17
Payer: MEDICARE

## 2025-06-17 ENCOUNTER — NON-APPOINTMENT (OUTPATIENT)
Age: 85
End: 2025-06-17

## 2025-06-17 PROCEDURE — 92012 INTRM OPH EXAM EST PATIENT: CPT

## 2025-09-17 ENCOUNTER — NON-APPOINTMENT (OUTPATIENT)
Age: 85
End: 2025-09-17

## 2025-09-17 ENCOUNTER — APPOINTMENT (OUTPATIENT)
Dept: OPHTHALMOLOGY | Facility: CLINIC | Age: 85
End: 2025-09-17
Payer: MEDICARE

## 2025-09-17 PROCEDURE — 92133 CPTRZD OPH DX IMG PST SGM ON: CPT

## 2025-09-17 PROCEDURE — 92012 INTRM OPH EXAM EST PATIENT: CPT

## (undated) DEVICE — LABEL MED BLANK W PEN

## (undated) DEVICE — APPLICATOR COTTON TIP 3" STERILE

## (undated) DEVICE — DRSG 4X4

## (undated) DEVICE — DRAPE TOWEL BLUE 17" X 24"

## (undated) DEVICE — Device